# Patient Record
Sex: FEMALE | Race: BLACK OR AFRICAN AMERICAN | NOT HISPANIC OR LATINO | URBAN - METROPOLITAN AREA
[De-identification: names, ages, dates, MRNs, and addresses within clinical notes are randomized per-mention and may not be internally consistent; named-entity substitution may affect disease eponyms.]

---

## 2022-01-04 PROCEDURE — U0004 COV-19 TEST NON-CDC HGH THRU: HCPCS | Performed by: NURSE PRACTITIONER

## 2022-01-07 ENCOUNTER — TELEPHONE (OUTPATIENT)
Dept: URGENT CARE | Facility: CLINIC | Age: 62
End: 2022-01-07

## 2022-01-07 NOTE — TELEPHONE ENCOUNTER
----- Message from José Miguel Ko MD sent at 1/5/2022  1:41 PM EST -----  Please inform patient of negative Covid test    ----- Message -----  From: Lab, Background User  Sent: 1/5/2022   1:33 PM EST  To: [facility]ckenzie Covid Results

## 2022-01-07 NOTE — TELEPHONE ENCOUNTER
----- Message from José Miguel Ko MD sent at 1/5/2022  1:41 PM EST -----  Please inform patient of negative Covid test    ----- Message -----  From: Lab, Background User  Sent: 1/5/2022   1:33 PM EST  To: [facility] Jeri Mckenzie Covid Results